# Patient Record
Sex: FEMALE | Race: OTHER | Employment: FULL TIME | URBAN - METROPOLITAN AREA
[De-identification: names, ages, dates, MRNs, and addresses within clinical notes are randomized per-mention and may not be internally consistent; named-entity substitution may affect disease eponyms.]

---

## 2021-02-11 ENCOUNTER — HOSPITAL ENCOUNTER (EMERGENCY)
Facility: HOSPITAL | Age: 61
Discharge: HOME | End: 2021-02-11
Payer: OTHER MISCELLANEOUS

## 2021-02-11 VITALS
RESPIRATION RATE: 18 BRPM | SYSTOLIC BLOOD PRESSURE: 132 MMHG | TEMPERATURE: 98 F | HEIGHT: 59 IN | WEIGHT: 145 LBS | HEART RATE: 110 BPM | DIASTOLIC BLOOD PRESSURE: 77 MMHG | OXYGEN SATURATION: 98 % | BODY MASS INDEX: 29.23 KG/M2

## 2021-02-11 DIAGNOSIS — S41.112A LACERATION OF LEFT UPPER EXTREMITY, INITIAL ENCOUNTER: Primary | ICD-10-CM

## 2021-02-11 PROCEDURE — 12002 RPR S/N/AX/GEN/TRNK2.6-7.5CM: CPT

## 2021-02-11 PROCEDURE — 99283 EMERGENCY DEPT VISIT LOW MDM: CPT

## 2021-02-11 PROCEDURE — 90471 IMMUNIZATION ADMIN: CPT

## 2021-02-11 RX ORDER — LIDOCAINE HYDROCHLORIDE 10 MG/ML
20 INJECTION, SOLUTION EPIDURAL; INFILTRATION; INTRACAUDAL; PERINEURAL ONCE
Status: COMPLETED | OUTPATIENT
Start: 2021-02-11 | End: 2021-02-11

## 2021-02-11 NOTE — ED INITIAL ASSESSMENT (HPI)
While at work pt was using a  to open a box and accidentally slipped cutting her left inner forearm. Bleeding controlled. Adipose tissue visible.  Tetanus not up to date

## 2021-02-11 NOTE — ED PROVIDER NOTES
Patient Seen in: [facility] AND [facility] Emergency Department      History   Patient presents with:  Laceration/Abrasion    Stated Complaint: laceration    HPI/Subjective:   HPI    64-year-old female, with a history of hypertension, presents to the emergency d oriented to person, place, and time. Cranial Nerves: No cranial nerve deficit. Sensory: No sensory deficit. ED Course   Labs Reviewed - No data to display       Laceration repair:     Verbal consent was obtained from the patient.  Barbara Zaidi

## (undated) NOTE — LETTER
Date & Time: 2/11/2021, 3:04 PM  Patient: Cory Ramirez  Encounter Provider(s):    PO Robertson       To Whom It May Concern:    Cory Ramirez was seen and treated in our department on 2/11/2021.  She can return to work with these [facility]